# Patient Record
Sex: MALE | Race: WHITE | Employment: FULL TIME | URBAN - METROPOLITAN AREA
[De-identification: names, ages, dates, MRNs, and addresses within clinical notes are randomized per-mention and may not be internally consistent; named-entity substitution may affect disease eponyms.]

---

## 2023-01-31 ENCOUNTER — APPOINTMENT (OUTPATIENT)
Dept: GENERAL RADIOLOGY | Age: 48
End: 2023-01-31
Attending: STUDENT IN AN ORGANIZED HEALTH CARE EDUCATION/TRAINING PROGRAM
Payer: COMMERCIAL

## 2023-01-31 ENCOUNTER — HOSPITAL ENCOUNTER (EMERGENCY)
Age: 48
Discharge: HOME | End: 2023-01-31
Attending: STUDENT IN AN ORGANIZED HEALTH CARE EDUCATION/TRAINING PROGRAM
Payer: COMMERCIAL

## 2023-01-31 VITALS
DIASTOLIC BLOOD PRESSURE: 71 MMHG | HEART RATE: 64 BPM | TEMPERATURE: 97.9 F | OXYGEN SATURATION: 99 % | SYSTOLIC BLOOD PRESSURE: 116 MMHG | RESPIRATION RATE: 16 BRPM

## 2023-01-31 DIAGNOSIS — S82.54XA NONDISPLACED FRACTURE OF MEDIAL MALLEOLUS OF RIGHT TIBIA, INITIAL ENCOUNTER FOR CLOSED FRACTURE: ICD-10-CM

## 2023-01-31 DIAGNOSIS — S42.255A NONDISPLACED FRACTURE OF GREATER TUBEROSITY OF LEFT HUMERUS, INITIAL ENCOUNTER FOR CLOSED FRACTURE: Primary | ICD-10-CM
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
## 2023-01-31 PROCEDURE — 73030 X-RAY EXAM OF SHOULDER: CPT

## 2023-01-31 PROCEDURE — 99283 EMERGENCY DEPT VISIT LOW MDM: CPT

## 2023-01-31 PROCEDURE — 73080 X-RAY EXAM OF ELBOW: CPT

## 2023-01-31 PROCEDURE — 73610 X-RAY EXAM OF ANKLE: CPT

## 2023-01-31 PROCEDURE — 74011250637 HC RX REV CODE- 250/637: Performed by: STUDENT IN AN ORGANIZED HEALTH CARE EDUCATION/TRAINING PROGRAM

## 2023-01-31 RX ORDER — HYDROCODONE BITARTRATE AND ACETAMINOPHEN 5; 325 MG/1; MG/1
1 TABLET ORAL
Qty: 12 TABLET | Refills: 0 | Status: SHIPPED | OUTPATIENT
Start: 2023-01-31 | End: 2023-02-03

## 2023-01-31 RX ORDER — HYDROCODONE BITARTRATE AND ACETAMINOPHEN 7.5; 325 MG/1; MG/1
1 TABLET ORAL ONCE
Status: COMPLETED | OUTPATIENT
Start: 2023-01-31 | End: 2023-01-31

## 2023-01-31 RX ADMIN — HYDROCODONE BITARTRATE AND ACETAMINOPHEN 1 TABLET: 7.5; 325 TABLET ORAL at 13:41

## 2023-01-31 NOTE — ED PROVIDER NOTES
69-year-old male with history of HLD, neuroma presents to ED with left shoulder pain and right ankle pain status post fall. Patient reports a prior to arrival he was in the attic and accidentally stepped on the wrong spot, falling through the ceiling. Patient reports that he believes that he hit the ladder leading to the attic on his way down. He reports that since then he has had some left shoulder pain and right ankle pain with worsening when he tries to bear weight. He notes reduced range of motion in his left shoulder. He does not believe that he hit his head in the fall, denies LOC. Denies any vision changes, HA, N/V/D, neck pain, back pain, numbness, tingling. The history is provided by the patient. Fall  Pertinent negatives include no fever, no nausea, no vomiting and no headaches. Past Medical History:   Diagnosis Date    Hypertriglyceridemia     Neuroma     LEFT FOOT, seeing podiatrist     Pure hypercholesterolemia 6/10/2015       Past Surgical History:   Procedure Laterality Date    HX ORTHOPAEDIC Left     l wrist fracture    HX WISDOM TEETH EXTRACTION           Family History:   Problem Relation Age of Onset    No Known Problems Mother     No Known Problems Father     Arthritis Brother         rheumatoid       Social History     Socioeconomic History    Marital status:      Spouse name: Not on file    Number of children: Not on file    Years of education: Not on file    Highest education level: Not on file   Occupational History    Occupation: IT work   Tobacco Use    Smoking status: Some Days     Types: Cigars    Smokeless tobacco: Never   Vaping Use    Vaping Use: Never used   Substance and Sexual Activity    Alcohol use:  Yes     Alcohol/week: 3.0 - 4.0 standard drinks     Types: 3 - 4 Glasses of wine per week    Drug use: Not Currently     Types: Marijuana     Comment: rare    Sexual activity: Yes     Partners: Female   Other Topics Concern    Not on file   Social History Narrative Not on file     Social Determinants of Health     Financial Resource Strain: Not on file   Food Insecurity: Not on file   Transportation Needs: Not on file   Physical Activity: Not on file   Stress: Not on file   Social Connections: Not on file   Intimate Partner Violence: Not on file   Housing Stability: Not on file         ALLERGIES: Patient has no known allergies. Review of Systems   Constitutional:  Negative for fever. HENT:  Negative for congestion and sinus pain. Respiratory:  Negative for cough. Cardiovascular:  Negative for chest pain. Gastrointestinal:  Negative for nausea and vomiting. Genitourinary:  Negative for dysuria. Musculoskeletal:  Negative for myalgias. Neurological:  Negative for headaches. Hematological:  Negative for adenopathy. All other systems reviewed and are negative. Vitals:    01/31/23 1149   BP: 116/71   Pulse: 64   Resp: 16   Temp: 97.9 °F (36.6 °C)   SpO2: 99%            Physical Exam  Vitals and nursing note reviewed. Constitutional:       Appearance: Normal appearance. Eyes:      Extraocular Movements: Extraocular movements intact. Pupils: Pupils are equal, round, and reactive to light. Cardiovascular:      Rate and Rhythm: Normal rate and regular rhythm. Heart sounds: Normal heart sounds. Pulmonary:      Effort: Pulmonary effort is normal.      Breath sounds: Normal breath sounds. Abdominal:      Palpations: Abdomen is soft. Tenderness: There is no abdominal tenderness. Musculoskeletal:      Right shoulder: Normal.      Left shoulder: Tenderness present. No swelling or deformity. Decreased range of motion. Normal strength. Normal pulse. Right elbow: No swelling or deformity. Normal range of motion. Tenderness present. Left elbow: No swelling or deformity. Normal range of motion. Tenderness present. Right wrist: Normal.      Left wrist: Normal.      Cervical back: No bony tenderness.       Thoracic back: No bony tenderness. Lumbar back: No bony tenderness. Right ankle: Swelling present. No deformity. Tenderness present over the medial malleolus. Decreased range of motion. Left ankle: Normal.   Lymphadenopathy:      Cervical: No cervical adenopathy. Skin:     General: Skin is warm and dry. Neurological:      General: No focal deficit present. Mental Status: He is alert and oriented to person, place, and time. Psychiatric:         Mood and Affect: Mood normal.         Behavior: Behavior normal.        Medical Decision Making  49 y.o. M with history of HLD and neuroma presents to ED with left shoulder and right ankle pain status post fall. Vital signs revealed patient afebrile in triage. Physical exam notable for left shoulder tenderness to palpation and reduced range of motion but no swelling or deformity. Bilateral elbow tenderness to palpation but no reduced range of motion, swelling or deformity. Noted right ankle swelling with tenderness to medial malleolus and reduced range of motion but normal pulse. Imaging included x-rays of bilateral elbows which showed no acute abnormalities, x-ray of left shoulder showed acute nondisplaced left greater tuberosity fracture and x-ray of right ankle showed age-indeterminate medial malleolus tip avulsion fracture. . I considered ordering head CT but given no reported head trauma, no LOC no headache and no vision changes, this is not indicated at this time. No significant socioeconomic barriers to care identified by patient during this visit. Had discussions with supervising physician and orthopedics as outlined below regarding the care of this patient. Discussed findings and reasoning with patient and family members who verbalized understanding. Patient will be discharged with prescription for Norco as well as left shoulder sling and right walking boot. Patient will also be discharged with instructions for conservative care, follow up and return precautions. This has all been discussed at length with patient and family members. Amount and/or Complexity of Data Reviewed  Radiology: ordered. Risk  Prescription drug management. ED Course as of 01/31/23 1445   Tue Jan 31, 2023   1357 Consulted with orthopedics []   [address] [place], [place] in ED and requested patient be an sling and boot, partial weightbearing and follow-up with Dr. Star Harvey []      ED Course User Index  [] ANALISA Yo       Procedures      Perfect Serve Consult for Orthopedic Surgery  1:45 PM    ED Room Number: R36/R36  Patient Name and age:  Consuelo Baez 49 y.o.  male  Working Diagnosis:   1. Nondisplaced fracture of greater tuberosity of left humerus, initial encounter for closed fracture    2. Nondisplaced fracture of medial malleolus of right tibia, initial encounter for closed fracture    3. Fall, initial encounter      Department: [facility] - [phone]    Other: 76-year-old male presents to ED with left shoulder, bilateral elbow and right ankle pain status post fall. Prior to arrival patient fell through the attic to the floor below. Having pain to left shoulder, bilateral elbows and right ankle. X-ray of left shoulder showed acute nondisplaced left greater tuberosity fracture. X-ray of right ankle showed medial malleolus tip avulsion fracture. Patient neurovascularly intact. Would appreciate advice as to neck steps and dispo. X-ray pictures to follow.

## 2023-01-31 NOTE — ED TRIAGE NOTES
Pt stated he fell through the attic pta, landed on the ladder with his foot then fell over, c/o left shoulder pain and right ankle pain, denies neck or back pain, denies loc , denies hitting his head

## 2023-02-03 ENCOUNTER — OFFICE VISIT (OUTPATIENT)
Dept: ORTHOPEDIC SURGERY | Age: 48
End: 2023-02-03
Payer: COMMERCIAL

## 2023-02-03 VITALS — WEIGHT: 250 LBS | HEIGHT: 75 IN | BODY MASS INDEX: 31.08 KG/M2

## 2023-02-03 DIAGNOSIS — S42.255A NONDISPLACED FRACTURE OF GREATER TUBEROSITY OF LEFT HUMERUS, INITIAL ENCOUNTER FOR CLOSED FRACTURE: Primary | ICD-10-CM

## 2023-02-03 DIAGNOSIS — S82.54XD CLOSED NONDISP FRACTURE OF RIGHT MEDIAL MALLEOLUS WITH ROUTINE HEALING: ICD-10-CM

## 2023-02-03 RX ORDER — NAPROXEN 500 MG/1
500 TABLET ORAL 2 TIMES DAILY WITH MEALS
COMMUNITY

## 2023-02-03 NOTE — PROGRESS NOTES
Oleg Riley (: 1975) is a 49 y.o. male, established patient, here for evaluation of the following chief complaint(s): Ankle Pain and Shoulder Pain       ASSESSMENT/PLAN:  Below is the assessment and plan developed based on review of pertinent history, physical exam, labs, studies, and medications. Findings were discussed with the patient and his wife today. We discussed his left proximal humerus fracture involving the greater tuberosity. We will follow this closely but as of right now the fracture is nondisplaced. We will repeat x-rays in 2 weeks. We will also repeat x-rays of the right ankle at that time. He will continue in his sling for the left shoulder and he will continue in his tall boot for the right ankle. He will ice and elevate. He will take a vitamin D and calcium supplement    1. Nondisplaced fracture of greater tuberosity of left humerus, initial encounter for closed fracture  -     REFERRAL TO DME  2. Closed nondisp fracture of right medial malleolus with routine healing  -     REFERRAL TO DME      Return in about 2 weeks (around 2023). SUBJECTIVE/OBJECTIVE:  Oleg Riley (: 1975) is a 49 y.o. male. He presents today with left shoulder pain and right ankle pain after recent injury where he fell through the attic. He describes sharp pains with attempted motion of the left shoulder and right ankle. He has been in a boot since this injury occurred as he was seen through the ER and diagnosed with fractures. He is right-hand dominant and prior to this injury he had no issues with the left shoulder or right ankle        No Known Allergies    Current Outpatient Medications   Medication Sig    naproxen (Naprosyn) 500 mg tablet Take 500 mg by mouth two (2) times daily (with meals). semaglutide (Rybelsus) 14 mg tablet Take 1 Tablet by mouth Daily (before breakfast). atorvastatin (LIPITOR) 20 mg tablet Take 1 Tablet by mouth daily.     levothyroxine (SYNTHROID) 75 mcg tablet Take 1 Tablet by mouth Daily (before breakfast). fenofibrate nanocrystallized (TRICOR) 145 mg tablet Take 1 Tablet by mouth daily. HYDROcodone-acetaminophen (Norco) 5-325 mg per tablet Take 1 Tablet by mouth every six (6) hours as needed for Pain for up to 3 days. Max Daily Amount: 4 Tablets. (Patient not taking: Reported on 2/3/2023)    metFORMIN (GLUCOPHAGE) 500 mg tablet  (Patient not taking: Reported on 2/3/2023)     No current facility-administered medications for this visit. Social History     Socioeconomic History    Marital status:      Spouse name: Not on file    Number of children: Not on file    Years of education: Not on file    Highest education level: Not on file   Occupational History    Occupation: IT work   Tobacco Use    Smoking status: Some Days     Types: Cigars    Smokeless tobacco: Never   Vaping Use    Vaping Use: Never used   Substance and Sexual Activity    Alcohol use:  Yes     Alcohol/week: 3.0 - 4.0 standard drinks     Types: 3 - 4 Glasses of wine per week    Drug use: Not Currently     Types: Marijuana     Comment: rare    Sexual activity: Yes     Partners: Female   Other Topics Concern    Not on file   Social History Narrative    Not on file     Social Determinants of Health     Financial Resource Strain: Not on file   Food Insecurity: Not on file   Transportation Needs: Not on file   Physical Activity: Not on file   Stress: Not on file   Social Connections: Not on file   Intimate Partner Violence: Not on file   Housing Stability: Not on file       Past Surgical History:   Procedure Laterality Date    HX ORTHOPAEDIC Left     l wrist fracture    HX WISDOM TEETH EXTRACTION         Family History   Problem Relation Age of Onset    No Known Problems Mother     No Known Problems Father     Arthritis Brother         rheumatoid               REVIEW OF SYSTEMS:  ROS    Positive for: Musculoskeletal  Last edited by Berta Farris on 2/3/2023  8:59 AM. Patient denies any recent fever, chills, nausea, vomiting, chest pain, or shortness of breath. Vitals:  Ht 6' 3\" (1.905 m)   Wt 250 lb (113.4 kg)   BMI 31.25 kg/m²    Body mass index is 31.25 kg/m². PHYSICAL EXAM:  General exam: Patient is awake, alert, and oriented x3. Well-appearing. No acute distress. Ambulates with an antalgic gait. Heart/Lungs:  no respiratory distress, palpable pulses    Left shoulder: Grossly neurovascular and sensory intact. There is generalized tenderness to palpation about the shoulder. Decreased range of motion is noted in all planes. Rotator cuff strength testing was painful and difficult to evaluate. Swelling and ecchymosis present. Right ankle: Neurovascular and sensory intact. Swelling and early ecchymosis is noted at the medial ankle. There is minimal tenderness to palpation at the distal fibula. There is tenderness to palpation at the medial malleolus. Decreased range of motion is noted secondary to pain and swelling. No tenderness to palpation is noted into the foot. No pain with manipulation of the Lisfranc joint. Negative proximal squeeze test.        IMAGING:  X-rays of the left shoulder from an outside facility were reviewed and show evidence of nondisplaced greater tuberosity fracture  XR Results (most recent):  Results from Hospital Encounter encounter on 01/31/23    XR ANKLE RT MIN 3 V    Narrative  EXAM: XR ANKLE RT MIN 3 V    INDICATION: r ankle pain s/p fall. COMPARISON: None. FINDINGS: Three views of the right ankle demonstrate age-indeterminate avulsion  fracture at the tip of the medial malleolus. Soft tissue swelling about the  ankle. Overall alignment is maintained. Impression  Age indeterminate medial malleolus tip avulsion fracture. Soft  tissue swelling about the ankle. XR ELBOW RT MIN 3 V    Narrative  EXAM: XR ELBOW RT MIN 3 V    INDICATION: elbow pain s/p fall. COMPARISON: None.     FINDINGS: Three views of the right elbow demonstrate no fracture, dislocation,  effusion or other acute abnormality. Impression  No acute abnormality. XR ELBOW LT MIN 3 V    Narrative  EXAM: XR ELBOW LT MIN 3 V    INDICATION: L elbow pain s/p fall. COMPARISON: None. FINDINGS: Three views of the left elbow demonstrate no fracture, dislocation,  effusion or other acute abnormality. Impression  No acute abnormality. Orders Placed This Encounter    [address] TO [facility] [id number]     Referral Priority:   Routine     Referral Type:   Consultation     Referral Reason:   Specialty Services Required     Number of Visits Requested:   1              An electronic signature was used to authenticate this note.   -- Krysta Welch DO

## 2023-02-03 NOTE — LETTER
2/3/2023    Patient: Chelsi Gomes   YOB: 1975   Date of Visit: 2/3/2023     [address] [place] [address]    Dear Ankit Nath MD,      Thank you for referring Mr. Chelsi Gomes to [facility] for evaluation. My notes for this consultation are attached. If you have questions, please do not hesitate to call me. I look forward to following your patient along with you.       Sincerely,    Lexus Cruz, DO

## 2023-02-17 ENCOUNTER — OFFICE VISIT (OUTPATIENT)
Dept: ORTHOPEDIC SURGERY | Age: 48
End: 2023-02-17
Payer: COMMERCIAL

## 2023-02-17 VITALS — HEIGHT: 75 IN | WEIGHT: 250 LBS | BODY MASS INDEX: 31.08 KG/M2

## 2023-02-17 DIAGNOSIS — S42.255A NONDISPLACED FRACTURE OF GREATER TUBEROSITY OF LEFT HUMERUS, INITIAL ENCOUNTER FOR CLOSED FRACTURE: ICD-10-CM

## 2023-02-17 DIAGNOSIS — S82.54XD CLOSED NONDISP FRACTURE OF RIGHT MEDIAL MALLEOLUS WITH ROUTINE HEALING: Primary | ICD-10-CM

## 2023-02-17 DIAGNOSIS — S93.401D MODERATE RIGHT ANKLE SPRAIN, SUBSEQUENT ENCOUNTER: ICD-10-CM

## 2023-02-17 NOTE — LETTER
2/17/2023    Patient: Jose Ayers   YOB: 1975   Date of Visit: 2/17/2023     Paula Marcelo MD  [address] [place] [address]    Dear Paula Marcelo MD,      Thank you for referring Mr. Jose Ayers to [facility] for evaluation. My notes for this consultation are attached. If you have questions, please do not hesitate to call me. I look forward to following your patient along with you.       Sincerely,    Craig Blanc, DO

## 2023-02-17 NOTE — PROGRESS NOTES
Rosette Pressley (: 1975) is a 49 y.o. male, established patient, here for evaluation of the following chief complaint(s):  Shoulder Pain and Ankle Pain       ASSESSMENT/PLAN:  Below is the assessment and plan developed based on review of pertinent history, physical exam, labs, studies, and medications. Findings were discussed with the patient today. He will continue with his current regimen and he will start a physical therapy regimen. They will work on passive and early active assisted range of motion. I will see him back in 1 month to assess his progress. He may weight-bear as tolerated and progress out of his boot for his right ankle    1. Closed nondisp fracture of right medial malleolus with routine healing  -     XR ANKLE RT MIN 3 V; Future  2. Nondisplaced fracture of greater tuberosity of left humerus, initial encounter for closed fracture  -     XR SHOULDER LT AP/LAT MIN 2 V; Future  -     REFERRAL TO PHYSICAL THERAPY  3. Moderate right ankle sprain, subsequent encounter  -     REFERRAL TO PHYSICAL THERAPY      Return in about 4 weeks (around 3/17/2023). SUBJECTIVE/OBJECTIVE:  Rosette Pressley (: 1975) is a 49 y.o. male. He presents today for follow-up of his left shoulder and right ankle injury. Pain has significantly improved        No Known Allergies    Current Outpatient Medications   Medication Sig    semaglutide, weight loss, (Wegovy) 0.5 mg/0.5 mL pnij 0.25 mg every seven (7) days for 28 days, THEN 0.5 mg every seven (7) days for 28 days. naproxen (Naprosyn) 500 mg tablet Take 500 mg by mouth two (2) times daily (with meals). atorvastatin (LIPITOR) 20 mg tablet Take 1 Tablet by mouth daily. levothyroxine (SYNTHROID) 75 mcg tablet Take 1 Tablet by mouth Daily (before breakfast). fenofibrate nanocrystallized (TRICOR) 145 mg tablet Take 1 Tablet by mouth daily.     metFORMIN (GLUCOPHAGE) 500 mg tablet  (Patient not taking: Reported on 2/3/2023)     No current facility-administered medications for this visit. Social History     Socioeconomic History    Marital status:      Spouse name: Not on file    Number of children: Not on file    Years of education: Not on file    Highest education level: Not on file   Occupational History    Occupation: IT work   Tobacco Use    Smoking status: Some Days     Types: Cigars    Smokeless tobacco: Never   Vaping Use    Vaping Use: Never used   Substance and Sexual Activity    Alcohol use: Yes     Alcohol/week: 3.0 - 4.0 standard drinks     Types: 3 - 4 Glasses of wine per week    Drug use: Not Currently     Types: Marijuana     Comment: rare    Sexual activity: Yes     Partners: Female   Other Topics Concern    Not on file   Social History Narrative    Not on file     Social Determinants of Health     Financial Resource Strain: Not on file   Food Insecurity: Not on file   Transportation Needs: Not on file   Physical Activity: Not on file   Stress: Not on file   Social Connections: Not on file   Intimate Partner Violence: Not on file   Housing Stability: Not on file       Past Surgical History:   Procedure Laterality Date    HX ORTHOPAEDIC Left     l wrist fracture    HX WISDOM TEETH EXTRACTION         Family History   Problem Relation Age of Onset    No Known Problems Mother     No Known Problems Father     Arthritis Brother         rheumatoid               REVIEW OF SYSTEMS:    Patient denies any recent fever, chills, nausea, vomiting, chest pain, or shortness of breath. Vitals:  Ht 6' 3\" (1.905 m)   Wt 250 lb (113.4 kg)   BMI 31.25 kg/m²    Body mass index is 31.25 kg/m². PHYSICAL EXAM:  General exam: Patient is awake, alert, and oriented x3. Well-appearing. No acute distress. Ambulates with an antalgic gait. Heart/Lungs:  no respiratory distress, palpable pulses    Left shoulder: Neurovascular and sensory intact. There is tenderness to palpation at the anterior lateral shoulder.   There is limited passive and active overhead range of motion. Pain is noted with impingement testing including Parikh exam.  Pain and weakness 4/5 is noted with rotator cuff strength testing including resisted abduction and resisted external rotation. Normal stability. There is some tenderness palpation at the [facility] joint and pain with crossarm exam.    Right ankle: Improved range of motion. Mild tenderness palpation at the medial and lateral malleolus. IMAGING:    XR Results (most recent):  Results from Appointment encounter on 02/17/23    XR ANKLE RT MIN 3 V    Narrative  X-rays of the right ankle 3 views done today show evidence of maintained joint space. Small avulsion fracture at the medial malleolus      XR SHOULDER LT AP/LAT MIN 2 V    Narrative  X-rays of the left shoulder 3 views done today show evidence of his nondisplaced greater tuberosity fracture with no signs of fracture migration      Results from Hospital Encounter encounter on 01/31/23    XR ANKLE RT MIN 3 V    Narrative  EXAM: XR ANKLE RT MIN 3 V    INDICATION: r ankle pain s/p fall. COMPARISON: None. FINDINGS: Three views of the right ankle demonstrate age-indeterminate avulsion  fracture at the tip of the medial malleolus. Soft tissue swelling about the  ankle. Overall alignment is maintained. Impression  Age indeterminate medial malleolus tip avulsion fracture. Soft  tissue swelling about the ankle. Orders Placed This Encounter    XR SHOULDER LT AP/LAT MIN 2 V     Standing Status:   Future     Number of Occurrences:   1     Standing Expiration Date:   5/17/2023    XR ANKLE RT MIN 3 V     Standing Status:   Future     Number of Occurrences:   1     Standing Expiration Date:   2/18/2024    REFERRAL TO PHYSICAL THERAPY     Referral Priority:   Routine     Referral Type:   PT/OT/ST     Referral Reason:   Specialty Services Required     Number of Visits Requested:   1              An electronic signature was used to authenticate this note.   -- Bishop Waterman, DO

## 2023-05-24 RX ORDER — FENOFIBRATE 145 MG/1
145 TABLET, COATED ORAL DAILY
COMMUNITY
Start: 2022-11-03

## 2023-05-24 RX ORDER — NAPROXEN 500 MG/1
500 TABLET ORAL 2 TIMES DAILY WITH MEALS
COMMUNITY

## 2023-05-24 RX ORDER — ATORVASTATIN CALCIUM 20 MG/1
20 TABLET, FILM COATED ORAL DAILY
COMMUNITY
Start: 2023-01-04

## 2023-05-24 RX ORDER — LEVOTHYROXINE SODIUM 0.07 MG/1
75 TABLET ORAL
COMMUNITY
Start: 2022-11-10